# Patient Record
(demographics unavailable — no encounter records)

---

## 2025-06-05 NOTE — PHYSICAL EXAM
[Alert] : alert [Normal Voice/Communication] : normal voice/communication [Healthy Appearing] : healthy appearing [No Acute Distress] : no acute distress [Sclera] : the sclera and conjunctiva were normal [Hearing Threshold Finger Rub Not Treasure] : hearing was normal [Normal Lips/Gums] : the lips and gums were normal [Oropharynx] : the oropharynx was normal [Normal Appearance] : the appearance of the neck was normal [No Neck Mass] : no neck mass was observed [No Respiratory Distress] : no respiratory distress [No Acc Muscle Use] : no accessory muscle use [Respiration, Rhythm And Depth] : normal respiratory rhythm and effort [Auscultation Breath Sounds / Voice Sounds] : lungs were clear to auscultation bilaterally [Heart Rate And Rhythm] : heart rate was normal and rhythm regular [Normal S1, S2] : normal S1 and S2 [Murmurs] : no murmurs [Bowel Sounds] : normal bowel sounds [Abdomen Tenderness] : non-tender [No Masses] : no abdominal mass palpated [Abdomen Soft] : soft [] : no hepatosplenomegaly [Oriented To Time, Place, And Person] : oriented to person, place, and time

## 2025-06-05 NOTE — ASSESSMENT
[FreeTextEntry1] : Patient is a 69-year-old male with hx of diabetes and stroke who was referred by Dr. Vicki Farmer for initial evaluation for colonoscopy. He had a colonoscopy about 10 years ago. Per patient, colonoscopy was normal. He does not recall the GI MD who performed the procedure.   Colonoscopy for CRC MiraLax bowel prep The procedure was discussed in detail with the patient, including indications, alternatives and limitations. The risks and benefits were reviewed.  The prep directions were reviewed as well, else the patient was told to fast on the day of the procedure.

## 2025-06-05 NOTE — HISTORY OF PRESENT ILLNESS
[FreeTextEntry1] : Patient is a 69-year-old male with hx of diabetes and stroke who was referred by Dr. Vicki Farmer for initial evaluation for colonoscopy. He had a colonoscopy about 10 years ago. Per patient, colonoscopy was normal. He does not recall the GI MD who performed the procedure.   Family history of colon cancer: no BM/D: 1-2 BM daily, no diarrhea Constipation; no Blood/mucus: no Change in bowel habits: no Weight changes: no GLP-1: no   Dysphagia: no Odynophagia: no GERD sx: occasional, when eating a lot of carbohydrate food Smoking: no, quit 20 years ago NSAID use: no CBC: none